# Patient Record
Sex: FEMALE | Race: WHITE
[De-identification: names, ages, dates, MRNs, and addresses within clinical notes are randomized per-mention and may not be internally consistent; named-entity substitution may affect disease eponyms.]

---

## 2020-04-28 ENCOUNTER — HOSPITAL ENCOUNTER (EMERGENCY)
Dept: HOSPITAL 61 - ER | Age: 35
Discharge: HOME | End: 2020-04-28
Payer: COMMERCIAL

## 2020-04-28 VITALS — WEIGHT: 163.14 LBS | BODY MASS INDEX: 27.18 KG/M2 | HEIGHT: 65 IN

## 2020-04-28 VITALS
DIASTOLIC BLOOD PRESSURE: 67 MMHG | SYSTOLIC BLOOD PRESSURE: 117 MMHG | DIASTOLIC BLOOD PRESSURE: 67 MMHG | SYSTOLIC BLOOD PRESSURE: 117 MMHG

## 2020-04-28 DIAGNOSIS — Z98.890: ICD-10-CM

## 2020-04-28 DIAGNOSIS — R10.9: ICD-10-CM

## 2020-04-28 DIAGNOSIS — Z88.8: ICD-10-CM

## 2020-04-28 DIAGNOSIS — O03.4: Primary | ICD-10-CM

## 2020-04-28 LAB
ALBUMIN SERPL-MCNC: 4.1 G/DL (ref 3.4–5)
ALBUMIN/GLOB SERPL: 1.3 {RATIO} (ref 1–1.7)
ALP SERPL-CCNC: 39 U/L (ref 46–116)
ALT SERPL-CCNC: 53 U/L (ref 14–59)
ANION GAP SERPL CALC-SCNC: 14 MMOL/L (ref 6–14)
AST SERPL-CCNC: 22 U/L (ref 15–37)
BASOPHILS # BLD AUTO: 0.1 X10^3/UL (ref 0–0.2)
BASOPHILS NFR BLD: 1 % (ref 0–3)
BILIRUB SERPL-MCNC: 0.5 MG/DL (ref 0.2–1)
BUN SERPL-MCNC: 11 MG/DL (ref 7–20)
BUN/CREAT SERPL: 12 (ref 6–20)
CALCIUM SERPL-MCNC: 9.2 MG/DL (ref 8.5–10.1)
CHLORIDE SERPL-SCNC: 103 MMOL/L (ref 98–107)
CO2 SERPL-SCNC: 23 MMOL/L (ref 21–32)
CREAT SERPL-MCNC: 0.9 MG/DL (ref 0.6–1)
EOSINOPHIL NFR BLD: 0 % (ref 0–3)
EOSINOPHIL NFR BLD: 0 X10^3/UL (ref 0–0.7)
ERYTHROCYTE [DISTWIDTH] IN BLOOD BY AUTOMATED COUNT: 12.4 % (ref 11.5–14.5)
GFR SERPLBLD BASED ON 1.73 SQ M-ARVRAT: 70.5 ML/MIN
GLOBULIN SER-MCNC: 3.1 G/DL (ref 2.2–3.8)
GLUCOSE SERPL-MCNC: 92 MG/DL (ref 70–99)
HCT VFR BLD CALC: 43.9 % (ref 36–47)
HGB BLD-MCNC: 15 G/DL (ref 12–15.5)
LYMPHOCYTES # BLD: 1.3 X10^3/UL (ref 1–4.8)
LYMPHOCYTES NFR BLD AUTO: 11 % (ref 24–48)
MCH RBC QN AUTO: 30 PG (ref 25–35)
MCHC RBC AUTO-ENTMCNC: 34 G/DL (ref 31–37)
MCV RBC AUTO: 88 FL (ref 79–100)
MONO #: 0.5 X10^3/UL (ref 0–1.1)
MONOCYTES NFR BLD: 4 % (ref 0–9)
NEUT #: 9.3 X10^3/UL (ref 1.8–7.7)
NEUTROPHILS NFR BLD AUTO: 83 % (ref 31–73)
PLATELET # BLD AUTO: 202 X10^3/UL (ref 140–400)
POTASSIUM SERPL-SCNC: 3.6 MMOL/L (ref 3.5–5.1)
PROT SERPL-MCNC: 7.2 G/DL (ref 6.4–8.2)
PROTHROMBIN TIME: 13.6 SEC (ref 11.7–14)
RBC # BLD AUTO: 5.01 X10^6/UL (ref 3.5–5.4)
SODIUM SERPL-SCNC: 140 MMOL/L (ref 136–145)
WBC # BLD AUTO: 11.2 X10^3/UL (ref 4–11)

## 2020-04-28 PROCEDURE — 80053 COMPREHEN METABOLIC PANEL: CPT

## 2020-04-28 PROCEDURE — 86850 RBC ANTIBODY SCREEN: CPT

## 2020-04-28 PROCEDURE — 96374 THER/PROPH/DIAG INJ IV PUSH: CPT

## 2020-04-28 PROCEDURE — 84702 CHORIONIC GONADOTROPIN TEST: CPT

## 2020-04-28 PROCEDURE — 85610 PROTHROMBIN TIME: CPT

## 2020-04-28 PROCEDURE — 76817 TRANSVAGINAL US OBSTETRIC: CPT

## 2020-04-28 PROCEDURE — 96375 TX/PRO/DX INJ NEW DRUG ADDON: CPT

## 2020-04-28 PROCEDURE — 86900 BLOOD TYPING SEROLOGIC ABO: CPT

## 2020-04-28 PROCEDURE — 36415 COLL VENOUS BLD VENIPUNCTURE: CPT

## 2020-04-28 PROCEDURE — 99285 EMERGENCY DEPT VISIT HI MDM: CPT

## 2020-04-28 PROCEDURE — 76801 OB US < 14 WKS SINGLE FETUS: CPT

## 2020-04-28 PROCEDURE — 86901 BLOOD TYPING SEROLOGIC RH(D): CPT

## 2020-04-28 PROCEDURE — 96376 TX/PRO/DX INJ SAME DRUG ADON: CPT

## 2020-04-28 PROCEDURE — 85025 COMPLETE CBC W/AUTO DIFF WBC: CPT

## 2020-04-28 NOTE — RAD
OB ultrasound less than 14 weeks 4/28/2020

 

CLINICAL HISTORY: First trimester pregnancy. Vaginal bleeding. Suspected 

impending miscarriage.

 

TECHNIQUE: A real-time ultrasound examination of the pelvis was performed.

Multiple images were obtained.

 

FINDINGS: The uterus is within normal limits in size and echogenicity. It 

measures 12.3 x 6.0 x 6.9 cm in longitudinal, transverse, and AP 

dimensions. The endometrial echo complex measures 1.4 cm in thickness 

which is within normal limits. A gestational sac is seen within the 

cervical canal. Within this gestational sac a structure which resembles an

embryonic pole is seen. This has a CRL of 5.6 mm and corresponds to a 

gestational age by ultrasound of 6 weeks 2 days plus or minus a standard 

deviation of 5 days. No embryonic cardiac activity is seen. This finding 

is consistent with embryonic demise.

 

Neither ovary is visualized. No adnexal mass is seen. A small amount of 

free fluid is seen within the pelvis.

 

IMPRESSION: Findings consistent with embryonic demise as discussed above.

 

Electronically signed by: Charles Quijano MD (4/28/2020 6:27 PM) [id number]

## 2020-04-28 NOTE — PHYS DOC
Past Medical History


Past Medical History:  Other


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


Past Surgical History:  


Smoking Status:  Never Smoker


Alcohol Use:  None





General Adult


EDM:


Chief Complaint:  VAGINAL BLEEDING PREGNANCY





HPI:


HPI:





Patient is a 35  year old female who presents with 9 weeks pregnant went to her 

OB doctor who is a Dr Dent.  Patient states yesterday when she was at her OB

doctor at the ultrasound there was no heartbeat.  She states today at 1400 she 

began bleeding with very large clots on the side of her hand.  She states she 

has been through numerous pads.  She states she is not sure how many but 

numerous.  Patient is now is having abdominal cramping she rates a 10 out of 10.

 Patient has 4 living kids and this will be her third miscarriage.





Review of Systems:


Review of Systems:





GI:  abdominal pain, denies nausea, vomiting, bloody stools or diarrhea. [] 


:  Denies dysuria. vaginal bleeding []





Heart Score:


Risk Factors:


Risk Factors:  DM, Current or recent (<one month) smoker, HTN, HLP, family 

history of CAD, obesity.


Risk Scores:


Score 0 - 3:  2.5% MACE over next 6 weeks - Discharge Home


Score 4 - 6:  20.3% MACE over next 6 weeks - Admit for Clinical Observation


Score 7 - 10:  72.7% MACE over next 6 weeks - Early Invasive Strategies





Current Medications:





Current Medications








 Medications


  (Trade)  Dose


 Ordered  Sig/Rosio  Start Time


 Stop Time Status Last Admin


Dose Admin


 


 Fentanyl Citrate


  (Fentanyl 2ml


 Vial)  50 mcg  1X  ONCE  20 18:00


 20 18:01 DC  














Allergies:


Allergies:





Allergies








Coded Allergies Type Severity Reaction Last Updated Verified


 


  Beta-Blockers (Beta-Adrenergic Bloc Allergy Unknown  20 Yes


 


  magnesium Allergy Unknown  20 Yes











Physical Exam:


PE:





Constitutional: Well developed, well nourished, no acute distress, non-toxic 

appearance. []


HENT: Normocephalic, atraumatic, bilateral external ears normal, oropharynx 

moist, no oral exudates, nose normal. []


Eyes: PERRLA, EOMI, conjunctiva normal, no discharge. [] 


Neck: Normal range of motion, no tenderness, supple, no stridor. [] 


Cardiovascular:Heart rate regular rhythm, no murmur []


Lungs & Thorax:  Bilateral breath sounds clear to auscultation []


Abdomen: Bowel sounds normal, soft, no tenderness, no masses, no pulsatile 

masses. Moderate vaginal bleeding [] 


Skin: Warm, dry, no erythema, no rash. [] 


Back: No tenderness, no CVA tenderness. [] 


Extremities: No tenderness, no cyanosis, no clubbing, ROM intact, no edema. [] 


Neurologic: Alert and oriented X 3, normal motor function, normal sensory 

function, no focal deficits noted. []


Psychologic: Affect normal, judgement normal, mood normal. []





Current Patient Data:


Vital Signs:





                                   Vital Signs








  Date Time  Temp Pulse Resp B/P (MAP) Pulse Ox O2 Delivery O2 Flow Rate FiO2


 


20 17:16 97.6 67 18 138/81 (100) 100 Room Air  





 97.6       











EKG:


EKG:


[]





Radiology/Procedures:


Radiology/Procedures:


[]


Impression:


                            [facility]


                    [address]  [place], [place] [id number] [phone]


                                        


                                 IMAGING REPORT





                                     Signed





PATIENT: VICKY VAZQUEZ   ACCOUNT: [id number]     MRN#: [id number]


: 1985           LOCATION: ER              AGE: 35


SEX: F                    EXAM DT: 20         ACCESSION#: [id number]


STATUS: REG ER            ORD. PHYSICIAN: DUONG CHRISTENSEN


REASON: VAGINAL BLEEDING, NO HEART BEAT YESTERDAY AT OB


PROCEDURE: OB <14 WKS W/TV





OB ultrasound less than 14 weeks 2020


 


CLINICAL HISTORY: First trimester pregnancy. Vaginal bleeding. Suspected 


impending miscarriage.


 


TECHNIQUE: A real-time ultrasound examination of the pelvis was performed.


Multiple images were obtained.


 


FINDINGS: The uterus is within normal limits in size and echogenicity. It 


measures 12.3 x 6.0 x 6.9 cm in longitudinal, transverse, and AP 


dimensions. The endometrial echo complex measures 1.4 cm in thickness 


which is within normal limits. A gestational sac is seen within the 


cervical canal. Within this gestational sac a structure which resembles an


embryonic pole is seen. This has a CRL of 5.6 mm and corresponds to a 


gestational age by ultrasound of 6 weeks 2 days plus or minus a standard 


deviation of 5 days. No embryonic cardiac activity is seen. This finding 


is consistent with embryonic demise.


 


Neither ovary is visualized. No adnexal mass is seen. A small amount of 


free fluid is seen within the pelvis.


 


IMPRESSION: Findings consistent with embryonic demise as discussed above.


 


Electronically signed by: Charles Quijano MD (2020 6:27 PM) [id number]














DICTATED and SIGNED BY:     CHARLES QUIJANO MD


DATE:     20 1827





Course & Med Decision Making:


Course & Med Decision Making


Pertinent Labs and Imaging studies reviewed. (See chart for details)





Alert and oriented. Abdomen is soft and nontender. Moderate vaginal bleeding. 

During pelvic exam I did remove either a large clot or a sac. 





Pelvic Exam: Chaperone present   


 Abdomen:      Nontender


 External Genitalia:   Normal Skin


 Speculum:      Normal vaginal mucosa, bloody cervical discharge


 Bimanual:       No adnexal masses or tenderness, No CMT








Patient recieved a dose total of 100mcg Fentanyl and 4mg Morphine. Patient now 

states her pain has stopped. Upon rechecking patients vaginal bleeding has 

slowed dramatically. Patient agrees to call her OB tomorrow to let them know of 

what happened today. I will send her home with pain medications. 





[]





Dragon Disclaimer:


Dragon Disclaimer:


This electronic medical record was generated, in whole or in part, using a voice

 recognition dictation system.





Departure


Departure


Impression:  


   Primary Impression:  


   Incomplete miscarriage


Disposition:   HOME, SELF-CARE


Condition:  STABLE


Referrals:  


NO PCP (PCP)


Patient Instructions:  Incomplete Miscarriage





Additional Instructions:  


Follow up with your OB tomorrow. Take medication with food and as prescribed. Do

 not operate any heavy machinery or drink any alcohol with this medication.  If 

you begin vaginally bleeding again and soaking more than 1 pad an hour come back

 to the emergency room.


Scripts


Ondansetron (ONDANSETRON ODT) 4 Mg Tab.rapdis


1 TAB PO PRN Q6-8HRS, #16 TAB


   Prov: DUONG CHRISTENSEN APRN         20 


Hydrocodone/Apap 5-325 (NORCO 5-325 TABLET) 1 Each Tablet


1 TAB PO PRN Q6HRS PRN for PAIN, #12 TAB 0 Refills


   Prov: DUONG CHRISTENSEN APRN         20











DUONG CHRISTENSEN APRN            2020 18:11

## 2021-07-20 ENCOUNTER — HOSPITAL ENCOUNTER (EMERGENCY)
Dept: HOSPITAL 61 - ER | Age: 36
Discharge: LEFT BEFORE DISCHARGE | End: 2021-07-20
Payer: COMMERCIAL

## 2021-07-20 DIAGNOSIS — Z53.21: ICD-10-CM

## 2021-07-20 DIAGNOSIS — M79.605: Primary | ICD-10-CM
